# Patient Record
Sex: MALE | Race: AMERICAN INDIAN OR ALASKA NATIVE
[De-identification: names, ages, dates, MRNs, and addresses within clinical notes are randomized per-mention and may not be internally consistent; named-entity substitution may affect disease eponyms.]

---

## 2019-02-17 ENCOUNTER — HOSPITAL ENCOUNTER (EMERGENCY)
Dept: HOSPITAL 5 - ED | Age: 54
LOS: 1 days | Discharge: HOME | End: 2019-02-18
Payer: COMMERCIAL

## 2019-02-17 DIAGNOSIS — Z90.89: ICD-10-CM

## 2019-02-17 DIAGNOSIS — Z88.2: ICD-10-CM

## 2019-02-17 DIAGNOSIS — F17.200: ICD-10-CM

## 2019-02-17 DIAGNOSIS — K21.9: ICD-10-CM

## 2019-02-17 DIAGNOSIS — R51: Primary | ICD-10-CM

## 2019-02-17 LAB
BASOPHILS # (AUTO): 0 K/MM3 (ref 0–0.1)
BASOPHILS NFR BLD AUTO: 0.4 % (ref 0–1.8)
BUN SERPL-MCNC: 24 MG/DL (ref 9–20)
BUN/CREAT SERPL: 27 %
CALCIUM SERPL-MCNC: 9.1 MG/DL (ref 8.4–10.2)
EOSINOPHIL # BLD AUTO: 0.4 K/MM3 (ref 0–0.4)
EOSINOPHIL NFR BLD AUTO: 4.3 % (ref 0–4.3)
HCT VFR BLD CALC: 42.1 % (ref 35.5–45.6)
HEMOLYSIS INDEX: 24
HGB BLD-MCNC: 13.6 GM/DL (ref 11.8–15.2)
LYMPHOCYTES # BLD AUTO: 2.9 K/MM3 (ref 1.2–5.4)
LYMPHOCYTES NFR BLD AUTO: 29.7 % (ref 13.4–35)
MCHC RBC AUTO-ENTMCNC: 32 % (ref 32–34)
MCV RBC AUTO: 86 FL (ref 84–94)
MONOCYTES # (AUTO): 0.7 K/MM3 (ref 0–0.8)
MONOCYTES % (AUTO): 7.6 % (ref 0–7.3)
PLATELET # BLD: 237 K/MM3 (ref 140–440)
RBC # BLD AUTO: 4.88 M/MM3 (ref 3.65–5.03)

## 2019-02-17 PROCEDURE — 93005 ELECTROCARDIOGRAM TRACING: CPT

## 2019-02-17 PROCEDURE — 93010 ELECTROCARDIOGRAM REPORT: CPT

## 2019-02-17 PROCEDURE — 80048 BASIC METABOLIC PNL TOTAL CA: CPT

## 2019-02-17 PROCEDURE — 70450 CT HEAD/BRAIN W/O DYE: CPT

## 2019-02-17 PROCEDURE — 36415 COLL VENOUS BLD VENIPUNCTURE: CPT

## 2019-02-17 PROCEDURE — 85025 COMPLETE CBC W/AUTO DIFF WBC: CPT

## 2019-02-18 VITALS — DIASTOLIC BLOOD PRESSURE: 70 MMHG | SYSTOLIC BLOOD PRESSURE: 109 MMHG

## 2019-02-18 NOTE — EMERGENCY DEPARTMENT REPORT
ED Headache HPI





- General


Chief Complaint: Dizziness


Stated Complaint: HEADACHE/DIZZINESS


Time Seen by Provider: 02/18/19 02:02





- History of Present Illness


Initial Comments: 





53-year-old -American male presents emerge department complaining of a 

few day history of dull throbbing headache, right-sided.  Headache is been off 

and on for about the last month and a half which she had initially had evaluated

at the [facility].  Stated he had a CAT scan that was negative and they 

diagnosed him with a migraine and gave him some medications for sinus 

infections, but his symptoms continue to persist.  Since that time.  He has not 

followed up with the specialist has not been on any home medications for his 

migraines.  Pain is dull and throbbing, worse with some white and some sound.  

No nausea, vomiting, is appreciated reports no fever, chills, sweats, chest 

pain, palpitations, coryza.  No hemoptysis.  No hematemesis, no hematochezia, no

blurry vision, no change in his blood pressure.  Denies any new medications nor 

trauma.  The headache is mild, rating a 2/10, but when the pain does flareup.  

It does increase to 8.


Quality: moderate


Head Injury Location: parietal


Recent Head Trauma: no recent headache/trauma


Associated Symptoms: sinus infection (was diagnosed with sinusitis about a month

and half ago and also GERD treated for peptic ulcer).  denies: facial pain, 

fever/chills, nasal congestion, nasal drainage, stiff neck, vision changes


Allergies/Adverse Reactions: 


Allergies





trazodone Allergy (Verified 02/17/19 22:33)


   Anaphylaxis








Home Medications: 


Ambulatory Orders





Butalb/Acetaminophen/Caffeine [Fioricet -40 mg CAP] 1 cap PO Q8HR PRN #15 

cap 02/18/19 











ED Review of Systems


ROS: 


Stated complaint: HEADACHE/DIZZINESS


Other details as noted in HPI





Constitutional: denies: chills, fever


Eyes: denies: eye pain, eye discharge, vision change


ENT: denies: ear pain, throat pain


Respiratory: denies: cough, shortness of breath, wheezing


Cardiovascular: denies: chest pain, palpitations


Endocrine: no symptoms reported


Gastrointestinal: denies: abdominal pain, nausea, diarrhea


Genitourinary: denies: urgency, dysuria


Musculoskeletal: denies: back pain, joint swelling, arthralgia


Skin: denies: rash, lesions


Neurological: headache.  denies: weakness, paresthesias


Psychiatric: denies: anxiety, depression


Hematological/Lymphatic: denies: easy bleeding, easy bruising





ED Past Medical Hx





- Past Medical History


Hx GERD: Yes


Additional medical history: H.pylori





- Surgical History


Past Surgical History?: Yes


Additional Surgical History: Tonsillectomy





- Social History


Smoking Status: Current Every Day Smoker


Substance Use Type: Alcohol





- Medications


Home Medications: 


                                Home Medications











 Medication  Instructions  Recorded  Confirmed  Last Taken  Type


 


Butalb/Acetaminophen/Caffeine 1 cap PO Q8HR PRN #15 cap 02/18/19  Unknown Rx





[Fioricet -40 mg CAP]     














ED Physical Exam





- General


Limitations: No Limitations


General appearance: alert, in no apparent distress





- Head


Head exam: Present: atraumatic, normocephalic





- Eye


Eye exam: Present: normal appearance, PERRL, EOMI.  Absent: scleral icterus, 

conjunctival injection, nystagmus, periorbital swelling, periorbital tenderness


Pupils: Present: normal accommodation, other (negative funduscopic examination)





- ENT


ENT exam: Present: normal exam, mucous membranes moist





- Neck


Neck exam: Present: normal inspection, full ROM.  Absent: tenderness, 

meningismus, lymphadenopathy, thyromegaly





- Respiratory


Respiratory exam: Present: normal lung sounds bilaterally.  Absent: respiratory 

distress





- Cardiovascular


Cardiovascular Exam: Present: regular rate, normal rhythm.  Absent: systolic 

murmur, diastolic murmur, rubs, gallop





- GI/Abdominal


GI/Abdominal exam: Present: soft, normal bowel sounds





- Rectal


Rectal exam: Present: deferred





- Extremities Exam


Extremities exam: Present: normal inspection





- Back Exam


Back exam: Present: normal inspection





- Neurological Exam


Neurological exam: Present: alert, oriented X3





- Psychiatric


Psychiatric exam: Present: normal affect, normal mood





- Skin


Skin exam: Present: warm, dry, intact, normal color.  Absent: rash





ED Course


                                   Vital Signs











  02/17/19





  22:58


 


Temperature 97.7 F


 


Pulse Rate 89


 


Respiratory 18





Rate 


 


Blood Pressure 146/92


 


O2 Sat by Pulse 99





Oximetry 














ED Medical Decision Making





- Lab Data


Result diagrams: 


                                 02/17/19 23:17





                                 02/17/19 23:17





- Radiology Data


Radiology results: report reviewed (negative CT scan findings)





- Medical Decision Making





The patient the need to follow-up with neurology is a hospital provided 

definitive management.  Also advised him need to keep a headache diary.  Advised

to return to emergency department should his headache worsen, he denies any 

passing out, numbness, tingling, vision changes.  Or anything to suggest a 

symptoms are worsening





- Differential Diagnosis


malignancy, migraine, aneurysm, tension headache


Critical care attestation.: 


If time is entered above; I have spent that time in minutes in the direct care 

of this critically ill patient, excluding procedure time.








ED Disposition


Clinical Impression: 


 Cephalgia





Disposition: DC-01 TO HOME OR SELFCARE


Is pt being admited?: No


Does the pt Need Aspirin: No


Condition: Stable


Instructions:  Viral Meningitis (ED), Cluster Headache (ED), Acute Headache (ED)


Prescriptions: 


Butalb/Acetaminophen/Caffeine [Fioricet -40 mg CAP] 1 cap PO Q8HR PRN #15 

cap


 PRN Reason: Headache


Referrals: 


[facility] [Provider Group] - 3-5 Days


[facility] [Provider Group] - 3-5 Days


WAN MOLINA MD [Staff Physician] - 3-5 Days


MARTY GOLDBERG MD [Referring] - 3-5 Days

## 2020-05-20 ENCOUNTER — HOSPITAL ENCOUNTER (EMERGENCY)
Dept: HOSPITAL 5 - ED | Age: 55
Discharge: HOME | End: 2020-05-20
Payer: OTHER GOVERNMENT

## 2020-05-20 VITALS — SYSTOLIC BLOOD PRESSURE: 124 MMHG | DIASTOLIC BLOOD PRESSURE: 74 MMHG

## 2020-05-20 DIAGNOSIS — Z88.8: ICD-10-CM

## 2020-05-20 DIAGNOSIS — Z90.89: ICD-10-CM

## 2020-05-20 DIAGNOSIS — Z79.899: ICD-10-CM

## 2020-05-20 DIAGNOSIS — F17.200: ICD-10-CM

## 2020-05-20 DIAGNOSIS — G89.29: ICD-10-CM

## 2020-05-20 DIAGNOSIS — M54.2: Primary | ICD-10-CM

## 2020-05-20 DIAGNOSIS — K21.9: ICD-10-CM

## 2020-05-20 PROCEDURE — 70360 X-RAY EXAM OF NECK: CPT

## 2020-05-20 NOTE — EMERGENCY DEPARTMENT REPORT
ED Neck Pain/Injury HPI





- General


Chief Complaint: Sore Throat


Stated Complaint: NECK PAIN


Time Seen by Provider: 05/20/20 22:02


Mode of arrival: Ambulatory


Limitations: No Limitations





- History of Present Illness


MD Complaint: neck pain


-: Gradual


Place: home


Radiation: right lateral


Severity: mild, moderate


Consistency: constant


Improves With: none


Worsens With: none


Associated Symptoms: none


Treatments Prior to Arrival: none





- Related Data


                                  Previous Rx's











 Medication  Instructions  Recorded  Last Taken  Type


 


Butalb/Acetaminophen/Caffeine 1 cap PO Q8HR PRN #15 cap 02/18/19 Unknown Rx





[Fioricet -40 mg CAP]    











                                    Allergies











Allergy/AdvReac Type Severity Reaction Status Date / Time


 


trazodone Allergy  Anaphylaxis Verified 02/17/19 22:33














ED Review of Systems


ROS: 


Stated complaint: NECK PAIN


Other details as noted in HPI





Comment: All other systems reviewed and negative





ED Past Medical Hx





- Past Medical History


Hx GERD: Yes


Additional medical history: H.pylori





- Surgical History


Additional Surgical History: Tonsillectomy





- Social History


Smoking Status: Current Every Day Smoker


Substance Use Type: Alcohol





- Medications


Home Medications: 


                                Home Medications











 Medication  Instructions  Recorded  Confirmed  Last Taken  Type


 


Butalb/Acetaminophen/Caffeine 1 cap PO Q8HR PRN #15 cap 02/18/19  Unknown Rx





[Fioricet -40 mg CAP]     














ED Physical Exam





- General


Limitations: No Limitations


General appearance: alert, in no apparent distress





- Head


Head exam: Present: atraumatic, normocephalic





- Eye


Eye exam: Present: normal appearance, PERRL, EOMI


Pupils: Present: normal accommodation





- ENT


ENT exam: Present: normal exam, normal orophraynx, mucous membranes moist, TM's 

normal bilaterally, other (Pharynx clear airway patent)





- Neck


Neck exam: Present: normal inspection, full ROM.  Absent: meningismus, 

lymphadenopathy, thyromegaly





- Respiratory


Respiratory exam: Present: normal lung sounds bilaterally.  Absent: respiratory 

distress





- Cardiovascular


Cardiovascular Exam: Present: regular rate, normal rhythm.  Absent: systolic 

murmur, diastolic murmur, rubs, gallop





- GI/Abdominal


GI/Abdominal exam: Present: soft, normal bowel sounds





- Rectal


Rectal exam: Present: deferred





- Extremities Exam


Extremities exam: Present: normal inspection





- Back Exam


Back exam: Present: normal inspection





- Neurological Exam


Neurological exam: Present: alert, oriented X3





- Psychiatric


Psychiatric exam: Present: normal affect, normal mood





- Skin


Skin exam: Present: warm, dry, intact, normal color.  Absent: rash





ED Course





                                   Vital Signs











  05/20/20





  19:59


 


Temperature 98.2 F


 


Pulse Rate 80


 


Respiratory 18





Rate 


 


Blood Pressure 124/74


 


O2 Sat by Pulse 97





Oximetry 











Critical care attestation.: 


If time is entered above; I have spent that time in minutes in the direct care 

of this critically ill patient, excluding procedure time.








ED Disposition


Clinical Impression: 


 Chronic neck pain





Disposition: DC-01 TO HOME OR SELFCARE


Is pt being admited?: No


Does the pt Need Aspirin: No


Condition: Stable


Instructions:  Chronic Pain (ED)


Additional Instructions: 


Please follow-up with 1 of the listed ear nose and throat doctors for further 

evaluation of your chronic throat pain.  Is recommended that you follow-up with 

them to receive further evaluation.  There is likely you will receive a scope on

MRI of this region


Referrals: 


PR[facility] CARE,MD [Primary Care Provider] - 3-5 Days


EUSEBIO BARRIOS MD [Staff Physician] - 2-3 Days


WAYNE DOWNEY MD [Staff Physician] - 2-3 Days

## 2020-05-20 NOTE — XRAY REPORT
SOFT TISSUES NECK 2233



INDICATION: MAIN: right sided pain  x I month no know injury



COMPARISON: None available.



FINDINGS: No soft tissue swelling is seen. No foreign bodies are obvious. No obvious airway impingeme
nt is seen. Minimal cervical degenerative changes are noted.







Signer Name: Judson Schulte MD 

Signed: 5/20/2020 10:50 PM

Workstation Name: [id number]

## 2022-09-17 ENCOUNTER — HOSPITAL ENCOUNTER (EMERGENCY)
Dept: HOSPITAL 5 - ED | Age: 57
Discharge: HOME | End: 2022-09-17
Payer: OTHER GOVERNMENT

## 2022-09-17 VITALS — SYSTOLIC BLOOD PRESSURE: 118 MMHG | DIASTOLIC BLOOD PRESSURE: 82 MMHG

## 2022-09-17 DIAGNOSIS — Z88.8: ICD-10-CM

## 2022-09-17 DIAGNOSIS — F17.200: ICD-10-CM

## 2022-09-17 DIAGNOSIS — J01.90: Primary | ICD-10-CM

## 2022-09-17 DIAGNOSIS — F12.90: ICD-10-CM

## 2022-09-17 PROCEDURE — 99282 EMERGENCY DEPT VISIT SF MDM: CPT

## 2022-09-17 NOTE — EMERGENCY DEPARTMENT REPORT
- General


Chief Complaint: Upper Respiratory Infection


Stated Complaint: SORE THROAT/SORE MUSCLES/HEADACHE


Source: patient


Mode of arrival: Ambulatory


Limitations: No Limitations





- History of Present Illness


Initial Comments: 


57-year-old male presents to the ED complaining of sore throat, headache,and  

bilateral ear pain x2-week.  Patient states taking over-the-counter medication 

without any relief.  Patient states symptoms are worsen when he is at work.  

Patient denies any nausea or vomiting at present time.  Patient denies any chest

pain or shortness of breath at present time.  Patient is alert and oriented x3. 

No acute distress noted.  No ill appearance noted.





MD Complaint: sore throat, rhinorrhea, nasal congestion, sinus pain


Onset/Timin


-: week(s)


Severity: mild


Improves With: nothing


Worsens With: nothing


Associated Symptoms: denies other symptoms.  denies: myalgias, diaphoresis, 

cough, shortness of breath, abdominal pain, confusion





- Related Data


                                  Previous Rx's











 Medication  Instructions  Recorded  Last Taken  Type


 


Butalb/Acetaminophen/Caffeine 1 cap PO Q8HR PRN #15 cap 19 Unknown Rx





[Fioricet -40 mg CAP]    


 


Amoxicillin/K Clav Tab [Augmentin 1 tab PO Q12HR 10 Days #20 tab 22 

Unknown Rx





875 mg]    


 


methylPREDNISolone [Medrol 4MG 4 mg PO DAILY 6 Days #21 tab 22 Unknown Rx





DOSEPAK (21 tabs)]    











                                    Allergies











Allergy/AdvReac Type Severity Reaction Status Date / Time


 


trazodone Allergy  Anaphylaxis Verified 22 11:11














ED Review of Systems


ROS: 


Stated complaint: SORE THROAT/SORE MUSCLES/HEADACHE


Other details as noted in HPI





Constitutional: denies: chills, fever


Eyes: denies: eye pain, eye discharge, vision change


ENT: ear pain, throat pain


Respiratory: denies: cough, shortness of breath, wheezing


Cardiovascular: denies: chest pain, palpitations


Endocrine: no symptoms reported


Gastrointestinal: denies: abdominal pain, nausea, diarrhea


Genitourinary: denies: urgency, dysuria


Musculoskeletal: denies: back pain, joint swelling, arthralgia


Skin: denies: rash, lesions


Neurological: headache.  denies: weakness, paresthesias


Psychiatric: denies: anxiety, depression


Hematological/Lymphatic: denies: easy bleeding, easy bruising





ED Past Medical Hx





- Past Medical History


Hx GERD: Yes


Additional medical history: H.pylori





- Surgical History


Additional Surgical History: Tonsillectomy





- Social History


Smoking Status: Current Every Day Smoker


Substance Use Type: Alcohol





- Medications


Home Medications: 


                                Home Medications











 Medication  Instructions  Recorded  Confirmed  Last Taken  Type


 


Butalb/Acetaminophen/Caffeine 1 cap PO Q8HR PRN #15 cap 19  Unknown Rx





[Fioricet -40 mg CAP]     


 


Amoxicillin/K Clav Tab [Augmentin 1 tab PO Q12HR 10 Days #20 tab 22  

Unknown Rx





875 mg]     


 


methylPREDNISolone [Medrol 4MG 4 mg PO DAILY 6 Days #21 tab 22  Unknown Rx





DOSEPAK (21 tabs)]     














ED Physical Exam





- General


Limitations: No Limitations


General appearance: alert, in no apparent distress





- Head


Head exam: Present: atraumatic, normocephalic





- Eye


Eye exam: Present: normal appearance





- ENT


ENT exam: Present: mucous membranes moist





- Expanded ENT Exam


  ** Expanded


TM/Canal exam: Mastoid Tenderness: Right TM, Left TM (frontal )





- Neck


Neck exam: Present: normal inspection





- Respiratory


Respiratory exam: Present: normal lung sounds bilaterally.  Absent: respiratory 

distress





- Cardiovascular


Cardiovascular Exam: Present: regular rate, normal rhythm.  Absent: systolic 

murmur, diastolic murmur, rubs, gallop





- GI/Abdominal


GI/Abdominal exam: Present: soft, normal bowel sounds





- Rectal


Rectal exam: Present: deferred





- Extremities Exam


Extremities exam: Present: normal inspection





- Back Exam


Back exam: Present: normal inspection





- Neurological Exam


Neurological exam: Present: alert, oriented X3





- Psychiatric


Psychiatric exam: Present: normal affect, normal mood





- Skin


Skin exam: Present: warm, dry, intact, normal color.  Absent: rash





ED Course





                                   Vital Signs











  22





  11:12


 


Temperature 99.7 F H


 


Pulse Rate 103 H


 


Respiratory 18





Rate 


 


Blood Pressure 119/91


 


O2 Sat by Pulse 97





Oximetry 














ED Medical Decision Making





- Medical Decision Making


57-year-old male presents to the ED complaining of sore throat, headache,and  

bilateral ear pain x2-week.  Patient states taking over-the-counter medication 

without any relief.  Patient states symptoms are worsen when he is at work.  

Patient denies any nausea or vomiting at present time.  Patient denies any chest

pain or shortness of breath at present time.  Patient is alert and oriented x3. 

No acute distress noted.  No ill appearance noted.  Physical examination patient

has tenderness noted to the frontal sinus cavity upon palpation  and ,postnasal 

drip.





Rechecked the patient is resting  quietly , comfortable and feeling better. I 

discussed the results of diagnostic study, my clinical impression and the plan 

for further treatment with the patient. Patient agrees with plan and discharge 

at this present time. All question addressed.





I have given the patient instruction regarding a diagnosis ,expectation ,follow-

up and return precaution. I explained to the patient that emergent condition may

arise and to return to the ED for new worsen and any new persisting condition. I

have explained the importance of following up with the primary care physician or

referral physician listed below has instructed. The patient verbalized 

understanding of discharge instruction.











Critical care attestation.: 


If time is entered above; I have spent that time in minutes in the direct care 

of this critically ill patient, excluding procedure time.








ED Disposition


Clinical Impression: 


Acute sinusitis


Qualifiers:


 Sinusitis location: frontal Recurrence: non-recurrent Qualified Code(s): J01.10

- Acute frontal sinusitis, unspecified





Disposition: 01 HOME / SELF CARE / HOMELESS


Is pt being admited?: No


Does the pt Need Aspirin: No


Condition: Stable


Instructions:  Sinusitis, Adult, Easy-to-Read


Additional Instructions: 


Take medication as prescribed


Return to  ED for any worsening symptom


Take over-the-counter Tylenol for fever


Prescriptions: 


Amoxicillin/K Clav Tab [Augmentin 875 mg] 1 tab PO Q12HR 10 Days #20 tab


methylPREDNISolone [Medrol 4MG DOSEPAK (21 tabs)] 4 mg PO DAILY 6 Days #21 tab


Referrals: 


[facility] [Provider Group] - 3-5 Days


Forms:  Work/School Release Form(ED)


Time of Disposition: 16:01